# Patient Record
Sex: MALE | Race: WHITE | NOT HISPANIC OR LATINO | URBAN - NONMETROPOLITAN AREA
[De-identification: names, ages, dates, MRNs, and addresses within clinical notes are randomized per-mention and may not be internally consistent; named-entity substitution may affect disease eponyms.]

---

## 2018-08-27 PROBLEM — H18.51: Noted: 2018-08-27

## 2018-08-27 PROBLEM — H16.223: Noted: 2018-08-27

## 2018-08-27 PROBLEM — Z96.1: Noted: 2018-08-27

## 2018-08-27 PROBLEM — H01.021: Noted: 2018-08-27

## 2018-08-27 PROBLEM — H01.024: Noted: 2018-08-27

## 2018-08-27 PROBLEM — H52.4: Noted: 2018-08-27

## 2019-09-12 ENCOUNTER — IMPORTED ENCOUNTER (OUTPATIENT)
Dept: URBAN - NONMETROPOLITAN AREA CLINIC 1 | Facility: CLINIC | Age: 79
End: 2019-09-12

## 2019-09-12 PROCEDURE — 92014 COMPRE OPH EXAM EST PT 1/>: CPT

## 2019-09-12 PROCEDURE — 92015 DETERMINE REFRACTIVE STATE: CPT

## 2019-09-12 NOTE — PATIENT DISCUSSION
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- Discussed signs and symptoms of progression- Recommend patient drinking plenty of water and starting Omega 3’s - Recommend Refresh or Systane  throughout the day again stressed importance- Consider Restasis or plugs in the future if no improvement- Continue to monitorBlepharitis OU- Discussed diagnosis in detail with patient- Recommend again patient using J [employer] J baby shampoo to scrub lid daily- Continue to monitorPseudophakia OU - Discussed diagnosis in detail with patient- Patient is stable and doing well with no glare complaint- Continue to monitor- RTC 1 year complete 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

## 2020-09-17 ENCOUNTER — IMPORTED ENCOUNTER (OUTPATIENT)
Dept: URBAN - NONMETROPOLITAN AREA CLINIC 1 | Facility: CLINIC | Age: 80
End: 2020-09-17

## 2020-09-17 PROCEDURE — 92015 DETERMINE REFRACTIVE STATE: CPT

## 2020-09-17 PROCEDURE — 92014 COMPRE OPH EXAM EST PT 1/>: CPT

## 2020-09-17 NOTE — PATIENT DISCUSSION
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- Discussed signs and symptoms of progression- Recommend patient drinking plenty of water and starting Omega 3’s - Recommend Refresh or Systane  throughout the day again stressed importance- Continue to monitorBlepharitis OU- Discussed diagnosis in detail with patient- Recommend again patient using J [employer] J baby shampoo to scrub lid daily- Continue to monitorPseudophakia OU - Discussed diagnosis in detail with patient- Continue to monitorPresbyopia OU- Discussed diagnosis in detail with patient- New glasses Rx given today- Few cell noted OS today ADVISED to patient that if he notices any sensitivty to light to come into the office ASAP - Continue to monitor

## 2021-09-21 ENCOUNTER — IMPORTED ENCOUNTER (OUTPATIENT)
Dept: URBAN - NONMETROPOLITAN AREA CLINIC 1 | Facility: CLINIC | Age: 81
End: 2021-09-21

## 2021-09-21 PROBLEM — H01.024: Noted: 2018-08-27

## 2021-09-21 PROBLEM — H52.4: Noted: 2018-08-27

## 2021-09-21 PROBLEM — H18.513: Noted: 2021-09-21

## 2021-09-21 PROBLEM — H16.223: Noted: 2018-08-27

## 2021-09-21 PROBLEM — Z96.1: Noted: 2018-08-27

## 2021-09-21 PROBLEM — H01.021: Noted: 2018-08-27

## 2021-09-21 PROCEDURE — 92015 DETERMINE REFRACTIVE STATE: CPT

## 2021-09-21 PROCEDURE — 99213 OFFICE O/P EST LOW 20 MIN: CPT

## 2021-09-21 NOTE — PATIENT DISCUSSION
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

## 2022-04-15 ASSESSMENT — VISUAL ACUITY
OD_SC: 20/20
OS_SC: 20/20
OS_SC: 20/20
OD_SC: 20/20
OS_SC: 20/20
OU_CC: 20/20
OD_SC: 20/20

## 2022-04-15 ASSESSMENT — TONOMETRY
OD_IOP_MMHG: 17
OD_IOP_MMHG: 17
OD_IOP_MMHG: 15
OS_IOP_MMHG: 16
OS_IOP_MMHG: 17
OS_IOP_MMHG: 15

## 2023-01-31 ENCOUNTER — EMERGENCY VISIT (OUTPATIENT)
Dept: URBAN - NONMETROPOLITAN AREA CLINIC 1 | Facility: CLINIC | Age: 83
End: 2023-01-31

## 2023-01-31 DIAGNOSIS — H02.886: ICD-10-CM

## 2023-01-31 PROCEDURE — 99213 OFFICE O/P EST LOW 20 MIN: CPT

## 2023-01-31 RX ORDER — ERYTHROMYCIN 5 MG/G: OINTMENT OPHTHALMIC

## 2023-01-31 RX ORDER — LOTEPREDNOL ETABONATE 5 MG/G: 1 GEL OPHTHALMIC

## 2023-01-31 ASSESSMENT — TONOMETRY
OD_IOP_MMHG: 17
OS_IOP_MMHG: 15

## 2023-01-31 ASSESSMENT — VISUAL ACUITY
OS_SC: 20/40
OU_SC: 20/40
OD_PH: 20/40
OS_PH: 20/20
OD_SC: 20/80

## 2023-01-31 NOTE — PATIENT DISCUSSION
Discussed diagnosis in detail with patient. Discussed signs and symptoms of progression. Recommend patient drinking plenty of water and starting Omega 3’s. Recommend Refresh or Systane throughout the day again stressed importance. Continue to monitor.

## 2023-01-31 NOTE — PATIENT DISCUSSION
Discussed diagnosis in detail with patient. Recommend again patient using J & J baby shampoo to scrub lid daily. Continue to monitor.

## 2023-01-31 NOTE — PATIENT DISCUSSION
Discussed diagnosis in detail with patient. Recommend patient using Lotemax SM (sample only). Recommend using Erythromycin ointment Rx sent to pharmacy and Ocusoft wipes, samples given today. Start hot compresses 10-15 minuets every hour. Monitor.